# Patient Record
Sex: MALE | Race: WHITE | NOT HISPANIC OR LATINO
[De-identification: names, ages, dates, MRNs, and addresses within clinical notes are randomized per-mention and may not be internally consistent; named-entity substitution may affect disease eponyms.]

---

## 2018-01-03 ENCOUNTER — RECORD ABSTRACTING (OUTPATIENT)
Age: 83
End: 2018-01-03

## 2018-01-03 DIAGNOSIS — F17.200 NICOTINE DEPENDENCE, UNSPECIFIED, UNCOMPLICATED: ICD-10-CM

## 2018-01-03 RX ORDER — WARFARIN 4 MG/1
4 TABLET ORAL
Refills: 0 | Status: ACTIVE | COMMUNITY

## 2018-01-29 ENCOUNTER — APPOINTMENT (OUTPATIENT)
Dept: CARDIOLOGY | Facility: CLINIC | Age: 83
End: 2018-01-29

## 2018-03-06 ENCOUNTER — APPOINTMENT (OUTPATIENT)
Dept: CARDIOLOGY | Facility: CLINIC | Age: 83
End: 2018-03-06
Payer: MEDICARE

## 2018-03-06 PROCEDURE — 93283 PRGRMG EVAL IMPLANTABLE DFB: CPT

## 2018-05-30 ENCOUNTER — RECORD ABSTRACTING (OUTPATIENT)
Age: 83
End: 2018-05-30

## 2018-06-01 ENCOUNTER — APPOINTMENT (OUTPATIENT)
Dept: CARDIOLOGY | Facility: CLINIC | Age: 83
End: 2018-06-01
Payer: MEDICARE

## 2018-06-01 VITALS
DIASTOLIC BLOOD PRESSURE: 70 MMHG | WEIGHT: 180 LBS | SYSTOLIC BLOOD PRESSURE: 141 MMHG | HEIGHT: 73 IN | BODY MASS INDEX: 23.86 KG/M2 | RESPIRATION RATE: 14 BRPM | HEART RATE: 88 BPM

## 2018-06-01 PROCEDURE — 93000 ELECTROCARDIOGRAM COMPLETE: CPT

## 2018-06-01 PROCEDURE — 99214 OFFICE O/P EST MOD 30 MIN: CPT

## 2018-06-19 ENCOUNTER — APPOINTMENT (OUTPATIENT)
Dept: CARDIOLOGY | Facility: CLINIC | Age: 83
End: 2018-06-19
Payer: MEDICARE

## 2018-06-19 PROCEDURE — 93280 PM DEVICE PROGR EVAL DUAL: CPT

## 2018-09-12 ENCOUNTER — APPOINTMENT (OUTPATIENT)
Dept: CARDIOLOGY | Facility: CLINIC | Age: 83
End: 2018-09-12
Payer: MEDICARE

## 2018-09-12 PROCEDURE — 93306 TTE W/DOPPLER COMPLETE: CPT

## 2018-09-20 ENCOUNTER — APPOINTMENT (OUTPATIENT)
Dept: CARDIOLOGY | Facility: CLINIC | Age: 83
End: 2018-09-20
Payer: MEDICARE

## 2018-09-20 VITALS
HEIGHT: 73 IN | RESPIRATION RATE: 14 BRPM | WEIGHT: 169 LBS | BODY MASS INDEX: 22.4 KG/M2 | SYSTOLIC BLOOD PRESSURE: 133 MMHG | HEART RATE: 77 BPM | DIASTOLIC BLOOD PRESSURE: 78 MMHG

## 2018-09-20 PROCEDURE — 99214 OFFICE O/P EST MOD 30 MIN: CPT

## 2018-09-20 PROCEDURE — 93000 ELECTROCARDIOGRAM COMPLETE: CPT

## 2018-10-02 ENCOUNTER — APPOINTMENT (OUTPATIENT)
Dept: CARDIOLOGY | Facility: CLINIC | Age: 83
End: 2018-10-02
Payer: MEDICARE

## 2018-10-02 PROCEDURE — 93280 PM DEVICE PROGR EVAL DUAL: CPT

## 2019-01-01 ENCOUNTER — APPOINTMENT (OUTPATIENT)
Dept: CARDIOLOGY | Facility: CLINIC | Age: 84
End: 2019-01-01

## 2019-01-01 ENCOUNTER — INBOUND DOCUMENT (OUTPATIENT)
Age: 84
End: 2019-01-01

## 2019-01-01 ENCOUNTER — APPOINTMENT (OUTPATIENT)
Dept: CARDIOLOGY | Facility: CLINIC | Age: 84
End: 2019-01-01
Payer: MEDICARE

## 2019-01-01 ENCOUNTER — NON-APPOINTMENT (OUTPATIENT)
Age: 84
End: 2019-01-01

## 2019-01-01 VITALS
SYSTOLIC BLOOD PRESSURE: 116 MMHG | RESPIRATION RATE: 14 BRPM | HEART RATE: 65 BPM | BODY MASS INDEX: 23.33 KG/M2 | WEIGHT: 176 LBS | HEIGHT: 73 IN | DIASTOLIC BLOOD PRESSURE: 72 MMHG

## 2019-01-01 VITALS
HEIGHT: 72 IN | DIASTOLIC BLOOD PRESSURE: 70 MMHG | RESPIRATION RATE: 14 BRPM | SYSTOLIC BLOOD PRESSURE: 120 MMHG | HEART RATE: 83 BPM | WEIGHT: 162 LBS | BODY MASS INDEX: 21.94 KG/M2

## 2019-01-01 DIAGNOSIS — R09.89 OTHER SPECIFIED SYMPTOMS AND SIGNS INVOLVING THE CIRCULATORY AND RESPIRATORY SYSTEMS: ICD-10-CM

## 2019-01-01 PROCEDURE — 93000 ELECTROCARDIOGRAM COMPLETE: CPT

## 2019-01-01 PROCEDURE — 93306 TTE W/DOPPLER COMPLETE: CPT

## 2019-01-01 PROCEDURE — 93880 EXTRACRANIAL BILAT STUDY: CPT

## 2019-01-01 PROCEDURE — 99215 OFFICE O/P EST HI 40 MIN: CPT

## 2019-01-01 PROCEDURE — 93289 INTERROG DEVICE EVAL HEART: CPT

## 2019-01-01 PROCEDURE — 99214 OFFICE O/P EST MOD 30 MIN: CPT

## 2019-01-01 RX ORDER — RAMIPRIL 2.5 MG/1
2.5 CAPSULE ORAL DAILY
Qty: 90 | Refills: 3 | Status: ACTIVE | COMMUNITY
Start: 2019-01-01 | End: 1900-01-01

## 2019-01-17 ENCOUNTER — APPOINTMENT (OUTPATIENT)
Dept: CARDIOLOGY | Facility: CLINIC | Age: 84
End: 2019-01-17
Payer: MEDICARE

## 2019-01-17 VITALS
WEIGHT: 175 LBS | BODY MASS INDEX: 23.19 KG/M2 | SYSTOLIC BLOOD PRESSURE: 140 MMHG | DIASTOLIC BLOOD PRESSURE: 80 MMHG | RESPIRATION RATE: 14 BRPM | HEIGHT: 73 IN

## 2019-01-17 PROCEDURE — 93000 ELECTROCARDIOGRAM COMPLETE: CPT

## 2019-01-17 PROCEDURE — 99214 OFFICE O/P EST MOD 30 MIN: CPT

## 2019-01-17 RX ORDER — CARVEDILOL 12.5 MG/1
12.5 TABLET, FILM COATED ORAL TWICE DAILY
Qty: 180 | Refills: 3 | Status: ACTIVE | COMMUNITY
Start: 2019-01-17 | End: 1900-01-01

## 2019-01-18 ENCOUNTER — NON-APPOINTMENT (OUTPATIENT)
Age: 84
End: 2019-01-18

## 2019-01-18 NOTE — PHYSICAL EXAM
[Normal Conjunctiva] : the conjunctiva exhibited no abnormalities [Eyelids - No Xanthelasma] : the eyelids demonstrated no xanthelasmas [Normal Oral Mucosa] : normal oral mucosa [No Oral Pallor] : no oral pallor [No Oral Cyanosis] : no oral cyanosis [Normal Jugular Venous A Waves Present] : normal jugular venous A waves present [Normal Jugular Venous V Waves Present] : normal jugular venous V waves present [No Jugular Venous Fontanez A Waves] : no jugular venous fontanez A waves [Respiration, Rhythm And Depth] : normal respiratory rhythm and effort [Auscultation Breath Sounds / Voice Sounds] : lungs were clear to auscultation bilaterally [FreeTextEntry1] : Irregular irregular rhythm with a moderate rate and systolic murmur grade 2/6 [Abdomen Soft] : soft [Abdomen Tenderness] : non-tender [Abdomen Mass (___ Cm)] : no abdominal mass palpated [Abnormal Walk] : normal gait [Gait - Sufficient For Exercise Testing] : the gait was sufficient for exercise testing [Nail Clubbing] : no clubbing of the fingernails [Cyanosis, Localized] : no localized cyanosis [Petechial Hemorrhages (___cm)] : no petechial hemorrhages [Skin Color & Pigmentation] : normal skin color and pigmentation [] : no rash [No Venous Stasis] : no venous stasis [Skin Lesions] : no skin lesions [No Skin Ulcers] : no skin ulcer [No Xanthoma] : no  xanthoma was observed [Oriented To Time, Place, And Person] : oriented to person, place, and time [Affect] : the affect was normal [Mood] : the mood was normal [No Anxiety] : not feeling anxious

## 2019-01-18 NOTE — HISTORY OF PRESENT ILLNESS
[FreeTextEntry1] : He has been tolerating his current medical regimen without difficulty and also his warfarin therapy without any significant bleeding or bruising noted;

## 2019-01-18 NOTE — ASSESSMENT
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

## 2019-01-18 NOTE — REASON FOR VISIT
[Follow-Up - Clinic] : a clinic follow-up of [FreeTextEntry1] : The patient is an 85-year-old white male with known history of chronic atrial fibrillation and nonischemic type cardiomyopathy who presents back to the office today for general cardiac checkup the;\par \par He also has a history of a dual chamber ICD which has been set in the VVIR mode;\par \par The patient reports she's been feeling generally well with just some fatigue but no chest pain, shortness of breath, palpitations, dizziness or syncope;

## 2019-01-18 NOTE — REVIEW OF SYSTEMS
[Feeling Fatigued] : feeling fatigued [Shortness Of Breath] : shortness of breath [Negative] : Heme/Lymph

## 2019-03-07 ENCOUNTER — APPOINTMENT (OUTPATIENT)
Dept: CARDIOLOGY | Facility: CLINIC | Age: 84
End: 2019-03-07
Payer: MEDICARE

## 2019-03-07 PROCEDURE — 93289 INTERROG DEVICE EVAL HEART: CPT

## 2019-05-02 NOTE — REASON FOR VISIT
[Follow-Up - Clinic] : a clinic follow-up of [FreeTextEntry1] : The patient is an 86-year-old white male with a known history of atrial fibrillation and severe cardiomyopathy with severe LV dysfunction who presents back to the office today for general cardiac checkup;\par \par Patient reports that he has been without significant symptoms of chest pain, shortness of breath, palpitations, dizziness or syncope\par \par He denies PND orthopnea and states he sleeps rather well;;

## 2019-05-02 NOTE — HISTORY OF PRESENT ILLNESS
[FreeTextEntry1] : At some point, possibly because of low blood pressure his carvedilol was changed to 12.5 b.i.d.;\par \par he gets regular INR checks but has not had any other recent laboratory blood tests he remarks;

## 2019-05-02 NOTE — PHYSICAL EXAM
[Eyelids - No Xanthelasma] : the eyelids demonstrated no xanthelasmas [Normal Conjunctiva] : the conjunctiva exhibited no abnormalities [Normal Oral Mucosa] : normal oral mucosa [No Oral Pallor] : no oral pallor [No Oral Cyanosis] : no oral cyanosis [Normal Jugular Venous A Waves Present] : normal jugular venous A waves present [Normal Jugular Venous V Waves Present] : normal jugular venous V waves present [No Jugular Venous Fontanez A Waves] : no jugular venous fontanez A waves [Respiration, Rhythm And Depth] : normal respiratory rhythm and effort [Auscultation Breath Sounds / Voice Sounds] : lungs were clear to auscultation bilaterally [Edema] : no peripheral edema present [FreeTextEntry1] : regular paced rhythm with a moderate rate and systolic murmur grade 2/6 [Abdomen Soft] : soft [Abdomen Tenderness] : non-tender [Abdomen Mass (___ Cm)] : no abdominal mass palpated [Abnormal Walk] : normal gait [Gait - Sufficient For Exercise Testing] : the gait was sufficient for exercise testing [Nail Clubbing] : no clubbing of the fingernails [Cyanosis, Localized] : no localized cyanosis [Petechial Hemorrhages (___cm)] : no petechial hemorrhages [Skin Color & Pigmentation] : normal skin color and pigmentation [] : no rash [No Venous Stasis] : no venous stasis [Skin Lesions] : no skin lesions [No Skin Ulcers] : no skin ulcer [No Xanthoma] : no  xanthoma was observed [Oriented To Time, Place, And Person] : oriented to person, place, and time [Affect] : the affect was normal [Mood] : the mood was normal [No Anxiety] : not feeling anxious

## 2019-05-02 NOTE — ASSESSMENT
[FreeTextEntry1] : EKG demonstrates probably paced rhythm at a rate of 80 with no underlying P waves seen and right bundle branch pattern-underlying atrial fibrillation noted;\par \par Last transthoracic echocardiogram demonstrates biatrial enlargement, LVH pattern with global hypokinesis and EF equals 25-30%;\par \par In summary the patient is an 86-year-old gentleman with a history of non-ischemic type cardiomyopathy and severe LV dysfunction with dual-chamber ICD in VVIR mode with stable cardiac pattern;\par \par Plan:\par \par Patient recommended to continue current medical regimen and anticoagulation with warfarin\par \par Time and INR checks with primary care\par \par Recommend laboratory blood tests to check renal functions and electrolytes as well in the near future\par \par Patient may not be good candidate for Entresto if significant renal insufficiency demonstrated;;;;

## 2019-11-13 PROBLEM — R09.89 CAROTID BRUIT: Status: ACTIVE | Noted: 2019-01-01

## 2019-11-13 NOTE — DISCUSSION/SUMMARY
[FreeTextEntry1] : 1).  Patient's most recent TTE demonstrated an LVEF of 25 to 30% in regards to Hx of severe LV dysfunction and cardiomyopathy;  will add ACEI in attempt to further enhance cardiac function.  He will begin taking Ramipril 2.5 mg daily.\par \par 2).  Patient is tolerating all other cardiac medications without negative side effects, he is to continue with these medications as directed (refer above).\par \par 3).  He is to complete a Transthoracic Echocardiogram and Carotid Doppler in the near future to assess cardiac function / cardiomyopathy and carotid plaquing respectively.\par \par 4).  Follow up with Arnaud Butler at our [place] office for ICD device interrogation.\par \par 5).  Follow up with PCP (Dr. Osborn) regarding routine checkups and blood work for Coumadin management, have copy faxed to our office.  \par \par 6).  Recommend patient report any untoward symptoms. \par \par 7).  Follow up with Dr. Olguin in 3 to 4 months or PRN.

## 2019-11-13 NOTE — ASSESSMENT
[FreeTextEntry1] : EKG 11/13/2019:  The EKG illustrates atrial fibrillation with moderate rate in the 80's, right bundle branch block pattern with left axis- bifascicular block.  Essentially unchanged.\par \par Most recent Dual Chamber ICD device interrogation (6/6/2019):  2 episodes of NSVT with longest 29 beats at 178 bpm.  No shocks or attempts.  No patient symptoms.\par \par Most recent blood work (8/2/2019):  Sodium (145), Potassium (4.2), BUN (46), Creatinine (2.08), eGFR (28).

## 2019-11-13 NOTE — HISTORY OF PRESENT ILLNESS
[FreeTextEntry1] : He reports his PCP (Dr. Osborn) is managing his PT-INR levels and has been recently sub-therapeutic at (1.7).  He was advised to take Coumadin 2 mg and 4 mg QOD and will recheck INR next Friday.\par \par Patient is tolerating cardiac medications without negative side effects including Coumadin as directed, Carvedilol 12.5 mg BID and Norvasc 2.5 mg QD.

## 2019-11-13 NOTE — REASON FOR VISIT
[FreeTextEntry1] : The patient is an 86-year-old white male with a known history of atrial fibrillation and severe cardiomyopathy with severe LV dysfunction who presents back to the office today for general cardiac checkup.  Mr. Berry reports feeling overall well and without cardiac complaints.  The patient denies CP, SOB, EWING, PND, orthopnea, palpitations, presyncope, syncope, edema.

## 2019-11-13 NOTE — PHYSICAL EXAM
[General Appearance - Well Developed] : well developed [Normal Appearance] : normal appearance [General Appearance - Well Nourished] : well nourished [General Appearance - In No Acute Distress] : no acute distress [Normal Conjunctiva] : the conjunctiva exhibited no abnormalities [Normal Oral Mucosa] : normal oral mucosa [Normal Oropharynx] : normal oropharynx [Normal Jugular Venous A Waves Present] : normal jugular venous A waves present [Normal Jugular Venous V Waves Present] : normal jugular venous V waves present [No Jugular Venous Fontanez A Waves] : no jugular venous fontanez A waves [] : no respiratory distress [Respiration, Rhythm And Depth] : normal respiratory rhythm and effort [Auscultation Breath Sounds / Voice Sounds] : lungs were clear to auscultation bilaterally [Edema] : no peripheral edema present [FreeTextEntry1] : Irregularly irregular rhythm with moderate rate, Grade II/VI systolic murmur, carotid bruit heard on right [Bowel Sounds] : normal bowel sounds [Abnormal Walk] : normal gait [Nail Clubbing] : no clubbing of the fingernails [Cyanosis, Localized] : no localized cyanosis [Skin Color & Pigmentation] : normal skin color and pigmentation [Oriented To Time, Place, And Person] : oriented to person, place, and time [Impaired Insight] : insight and judgment were intact [Affect] : the affect was normal

## 2020-01-01 ENCOUNTER — APPOINTMENT (OUTPATIENT)
Dept: CARDIOLOGY | Facility: CLINIC | Age: 85
End: 2020-01-01
Payer: MEDICARE

## 2020-01-01 ENCOUNTER — NON-APPOINTMENT (OUTPATIENT)
Age: 85
End: 2020-01-01

## 2020-01-01 VITALS
DIASTOLIC BLOOD PRESSURE: 72 MMHG | HEART RATE: 71 BPM | WEIGHT: 169 LBS | HEIGHT: 72 IN | RESPIRATION RATE: 14 BRPM | BODY MASS INDEX: 22.89 KG/M2 | SYSTOLIC BLOOD PRESSURE: 124 MMHG

## 2020-01-01 DIAGNOSIS — I10 ESSENTIAL (PRIMARY) HYPERTENSION: ICD-10-CM

## 2020-01-01 DIAGNOSIS — I42.8 OTHER CARDIOMYOPATHIES: ICD-10-CM

## 2020-01-01 DIAGNOSIS — I51.9 HEART DISEASE, UNSPECIFIED: ICD-10-CM

## 2020-01-01 DIAGNOSIS — I48.91 UNSPECIFIED ATRIAL FIBRILLATION: ICD-10-CM

## 2020-01-01 DIAGNOSIS — Z95.810 PRESENCE OF AUTOMATIC (IMPLANTABLE) CARDIAC DEFIBRILLATOR: ICD-10-CM

## 2020-01-01 DIAGNOSIS — Z95.0 PRESENCE OF CARDIAC PACEMAKER: ICD-10-CM

## 2020-01-01 PROCEDURE — 99214 OFFICE O/P EST MOD 30 MIN: CPT

## 2020-01-01 PROCEDURE — 93289 INTERROG DEVICE EVAL HEART: CPT

## 2020-01-01 PROCEDURE — 93000 ELECTROCARDIOGRAM COMPLETE: CPT

## 2020-02-25 PROBLEM — Z95.810 AICD PRESENT, DOUBLE CHAMBER: Status: ACTIVE | Noted: 2018-09-20

## 2020-02-25 PROBLEM — I42.8 CARDIOMYOPATHY, NONISCHEMIC: Status: ACTIVE | Noted: 2018-01-03

## 2020-02-25 PROBLEM — I51.9 LV DYSFUNCTION: Status: ACTIVE | Noted: 2018-01-03

## 2020-02-25 NOTE — HISTORY OF PRESENT ILLNESS
[FreeTextEntry1] : He states he's been taking his medications regularly and he has had no falling episodes or injuries;\par \par He denies any significant bleeding or bruising on warfarin; (most recent INR equals 2.4);\par \par Patient underwent recent carotid duplex study and echocardiogram and is here to discuss those results;

## 2020-02-25 NOTE — REASON FOR VISIT
[Follow-Up - Clinic] : a clinic follow-up of [FreeTextEntry1] : The patient is an 86-year-old white male with a known history of chronic/permanent atrial fibrillation, moderate to severe cardiomyopathy with LV dysfunction with history of AICD who is been on anticoagulation with warfarin and recently started on Ramapril for additional "cardio protection";\par \par Overall, patient reports she's been feeling fairly well without significant symptoms of chest pain, shortness of breath, palpitations or dizziness\par \par ;

## 2020-02-25 NOTE — ASSESSMENT
[FreeTextEntry1] : EKG demonstrates atrial fibrillation with a moderate ventricular rate in the 80s, right bundle branch block pattern with left anterior fascicular block-essentially unchanged\par \par Carotid duplex study from 12/11/19 demonstrates moderate carotid plaquing stenosis on the left ICA in the range of 50-69%\par \par Mild carotid plaquing stenosis on right ICA less than 50%--essentially unchanged;\par \par Transthoracic echo from 12/11/19 demonstrating biatrial enlargement, mild to moderate LVH with global reduced systolic ejection fraction in the range of 30-35% moderate dilatation of the ascending aorta and aortic root. Mild MR and TR  moderate AI;;\par \par \par In summary the patient is an 86-year-old gentleman with history of cardiomyopathy and LV dysfunction, chronic atrial fibrillation with controlled rate and stable LVEF on recent ejection fraction with no overt signs of heart failure\par \par Plan:\par \par Patient encouraged to continue current enhanced medical regimen\par \par No additional cardiac workup indicated at this time;\par \par Continue timely PT INR checks for Coumadin;\par \par Followup to primary care for general checkup some laboratory blood test encouraged\par ;;;;

## 2020-02-25 NOTE — PHYSICAL EXAM
[Normal Conjunctiva] : the conjunctiva exhibited no abnormalities [Eyelids - No Xanthelasma] : the eyelids demonstrated no xanthelasmas [No Oral Pallor] : no oral pallor [Normal Oral Mucosa] : normal oral mucosa [Normal Jugular Venous V Waves Present] : normal jugular venous V waves present [Normal Jugular Venous A Waves Present] : normal jugular venous A waves present [No Oral Cyanosis] : no oral cyanosis [No Jugular Venous Fontanez A Waves] : no jugular venous fontanez A waves [Respiration, Rhythm And Depth] : normal respiratory rhythm and effort [Auscultation Breath Sounds / Voice Sounds] : lungs were clear to auscultation bilaterally [Edema] : no peripheral edema present [FreeTextEntry1] : slightly diminished breath sounds [Abdomen Soft] : soft [Abdomen Tenderness] : non-tender [Gait - Sufficient For Exercise Testing] : the gait was sufficient for exercise testing [Abdomen Mass (___ Cm)] : no abdominal mass palpated [Abnormal Walk] : normal gait [Nail Clubbing] : no clubbing of the fingernails [Cyanosis, Localized] : no localized cyanosis [Skin Color & Pigmentation] : normal skin color and pigmentation [Petechial Hemorrhages (___cm)] : no petechial hemorrhages [No Venous Stasis] : no venous stasis [] : no rash [Skin Lesions] : no skin lesions [No Xanthoma] : no  xanthoma was observed [No Skin Ulcers] : no skin ulcer [Oriented To Time, Place, And Person] : oriented to person, place, and time [Affect] : the affect was normal [No Anxiety] : not feeling anxious [Mood] : the mood was normal

## 2020-05-19 ENCOUNTER — APPOINTMENT (OUTPATIENT)
Dept: CARDIOLOGY | Facility: CLINIC | Age: 85
End: 2020-05-19

## 2020-07-01 ENCOUNTER — APPOINTMENT (OUTPATIENT)
Dept: CARDIOLOGY | Facility: CLINIC | Age: 85
End: 2020-07-01